# Patient Record
Sex: MALE | Race: WHITE | NOT HISPANIC OR LATINO | URBAN - METROPOLITAN AREA
[De-identification: names, ages, dates, MRNs, and addresses within clinical notes are randomized per-mention and may not be internally consistent; named-entity substitution may affect disease eponyms.]

---

## 2022-07-05 ENCOUNTER — EMERGENCY (EMERGENCY)
Facility: HOSPITAL | Age: 32
LOS: 0 days | Discharge: HOME | End: 2022-07-06
Attending: EMERGENCY MEDICINE
Payer: COMMERCIAL

## 2022-07-05 VITALS — WEIGHT: 250 LBS | HEIGHT: 69 IN
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
## 2022-07-05 DIAGNOSIS — M25.511 PAIN IN RIGHT SHOULDER: ICD-10-CM
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
## 2022-07-05 PROCEDURE — 99284 EMERGENCY DEPT VISIT MOD MDM: CPT

## 2022-07-05 PROCEDURE — 73030 X-RAY EXAM OF SHOULDER: CPT | Mod: RT

## 2022-07-05 PROCEDURE — 99283 EMERGENCY DEPT VISIT LOW MDM: CPT | Mod: 25

## 2022-07-05 PROCEDURE — 73030 X-RAY EXAM OF SHOULDER: CPT | Mod: 26,RT

## 2022-07-05 NOTE — ED ADULT TRIAGE NOTE - CHIEF COMPLAINT QUOTE
Pt presents to the ED c/o right shoulder pain. Pt states he was playing softball tonight when he slide head first and heard a pop. Pt unable to move extremity. No medication PTA.

## 2022-07-06 VITALS
OXYGEN SATURATION: 99 % | SYSTOLIC BLOOD PRESSURE: 116 MMHG | DIASTOLIC BLOOD PRESSURE: 80 MMHG | RESPIRATION RATE: 17 BRPM | TEMPERATURE: 98 F | HEART RATE: 97 BPM

## 2022-07-06 NOTE — ED PROVIDER NOTE - PATIENT PORTAL LINK FT
You can access the FollowMyHealth Patient Portal offered by [facility] by registering at the following website: [email or web address]. By joining [facility]’s FollowMyHealth portal, you will also be able to view your health information using other applications (apps) compatible with our system.

## 2022-07-06 NOTE — ED PROVIDER NOTE - NSFOLLOWUPINSTRUCTIONS_ED_ALL_ED_FT
please follow up with your orthopedist in 2-3 days.   use sling for comfort.    take motrin and tylenol for pain.   return to ED for any concerns

## 2022-07-07 NOTE — ED POST DISCHARGE NOTE - RESULT SUMMARY
Patient called with question about Xray, Unable to reach on return call, left message. - Laureano Page PA-C

## 2022-07-08 ENCOUNTER — APPOINTMENT (OUTPATIENT)
Dept: ORTHOPEDIC SURGERY | Facility: CLINIC | Age: 32
End: 2022-07-08

## 2022-07-08 VITALS — WEIGHT: 250 LBS | BODY MASS INDEX: 37.03 KG/M2 | HEIGHT: 69 IN

## 2022-07-08 DIAGNOSIS — M25.511 PAIN IN RIGHT SHOULDER: ICD-10-CM

## 2022-07-08 DIAGNOSIS — S43.004A UNSPECIFIED DISLOCATION OF RIGHT SHOULDER JOINT, INITIAL ENCOUNTER: ICD-10-CM

## 2022-07-08 PROBLEM — Z00.00 ENCOUNTER FOR PREVENTIVE HEALTH EXAMINATION: Status: ACTIVE | Noted: 2022-07-08

## 2022-07-08 PROBLEM — Z78.9 OTHER SPECIFIED HEALTH STATUS: Chronic | Status: ACTIVE | Noted: 2022-07-06

## 2022-07-08 PROCEDURE — 99213 OFFICE O/P EST LOW 20 MIN: CPT

## 2022-07-08 NOTE — HISTORY OF PRESENT ILLNESS
[4] : 4 [1] : 2 [Dull/Aching] : dull/aching [Localized] : localized [Tightness] : tightness [Sleep] : sleep [Rest] : rest [Exercising] : exercising [de-identified] : 7/8/22;  acute onset right shoulder pain.  shoulder popped out and back in. [] : no [FreeTextEntry1] : right shoulder [FreeTextEntry3] : 7/5/22 [FreeTextEntry5] : patient states he was playing softball and slid head first into a base and felt his shoulder dislocate. Went to [facility] ER and shoulder was reset. Given a sling. History of rotator cuff tear [FreeTextEntry6] : m [FreeTextEntry9] : minimal pain in sling [de-identified] : 7/5/22 [de-identified] : [facility] [de-identified] : xray

## 2022-07-08 NOTE — ASSESSMENT
[FreeTextEntry1] : acute onset right shoulder pain when shoulder popped out around 7/5/22.  concern for cuff / labral tear.

## 2022-07-08 NOTE — PHYSICAL EXAM
[Right] : right shoulder [3 ___] : forward flexion 3[unfilled]/5 [3___] : external rotation 3[unfilled]/5 [4___] : internal rotation 4[unfilled]/5 [] : motor and sensory intact distally [TWNoteComboBox7] : active forward flexion 40 degrees [TWNoteComboBox4] : passive forward flexion 90 degrees [TWNoteComboBox6] : internal rotation 30 degrees [de-identified] : external rotation 30 degrees

## 2022-07-08 NOTE — DATA REVIEWED
[Outside X-rays] : outside x-rays [Right] : of the right [I reviewed the films/CD and agree] : I reviewed the films/CD and agree

## 2022-07-10 ENCOUNTER — FORM ENCOUNTER (OUTPATIENT)
Age: 32
End: 2022-07-10

## 2022-07-11 ENCOUNTER — APPOINTMENT (OUTPATIENT)
Dept: MRI IMAGING | Facility: CLINIC | Age: 32
End: 2022-07-11

## 2022-07-11 PROCEDURE — 73221 MRI JOINT UPR EXTREM W/O DYE: CPT | Mod: RT

## 2022-07-14 ENCOUNTER — TRANSCRIPTION ENCOUNTER (OUTPATIENT)
Age: 32
End: 2022-07-14

## 2022-07-15 ENCOUNTER — APPOINTMENT (OUTPATIENT)
Dept: ORTHOPEDIC SURGERY | Facility: CLINIC | Age: 32
End: 2022-07-15

## 2022-07-15 VITALS — HEIGHT: 69 IN | WEIGHT: 280 LBS | BODY MASS INDEX: 41.47 KG/M2

## 2022-07-15 DIAGNOSIS — S43.431A SUPERIOR GLENOID LABRUM LESION OF RIGHT SHOULDER, INITIAL ENCOUNTER: ICD-10-CM

## 2022-07-15 PROCEDURE — 99214 OFFICE O/P EST MOD 30 MIN: CPT

## 2022-07-15 NOTE — PHYSICAL EXAM
[Right] : right shoulder [3 ___] : forward flexion 3[unfilled]/5 [3___] : external rotation 3[unfilled]/5 [4___] : internal rotation 4[unfilled]/5 [] : motor and sensory intact distally [TWNoteComboBox7] : active forward flexion 40 degrees [TWNoteComboBox4] : passive forward flexion 90 degrees [TWNoteComboBox6] : internal rotation 30 degrees [de-identified] : external rotation 30 degrees

## 2022-07-15 NOTE — HISTORY OF PRESENT ILLNESS
[Dull/Aching] : dull/aching [Localized] : localized [Tightness] : tightness [Sleep] : sleep [Rest] : rest [Exercising] : exercising [Full time] : Work status: full time [4] : 4 [1] : 2 [de-identified] : 7/8/22;  acute onset right shoulder pain.  shoulder popped out and back in. [] : no [FreeTextEntry1] : right shoulder [FreeTextEntry3] : 7/5/22 [FreeTextEntry5] : patient states he was playing softball and slid head first into a base and felt his shoulder dislocate. Went to [facility] ER and shoulder was reset. Given a sling. History of rotator cuff tear [FreeTextEntry9] : minimal pain in sling [de-identified] : 7/5/22 [de-identified] : [facility] [de-identified] : xray [de-identified] : MRI

## 2022-07-15 NOTE — ASSESSMENT
[FreeTextEntry1] : acute onset right shoulder pain when shoulder popped out around 7/5/22.  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

## 2022-08-08 ENCOUNTER — APPOINTMENT (OUTPATIENT)
Age: 32
End: 2022-08-08
Payer: COMMERCIAL

## 2022-08-08 PROCEDURE — 29826 SHO ARTHRS SRG DECOMPRESSION: CPT | Mod: RT

## 2022-08-08 PROCEDURE — 29821 SHO ARTHRS SRG COMPL SYNVCT: CPT | Mod: AS,59,RT

## 2022-08-08 PROCEDURE — 29821 SHO ARTHRS SRG COMPL SYNVCT: CPT | Mod: 59,RT

## 2022-08-08 PROCEDURE — 29827 SHO ARTHRS SRG RT8TR CUF RPR: CPT | Mod: RT

## 2022-08-08 PROCEDURE — 29827 SHO ARTHRS SRG RT8TR CUF RPR: CPT | Mod: AS,RT

## 2022-08-08 PROCEDURE — 29828 SHO ARTHRS SRG BICP TENODSIS: CPT | Mod: 59,RT

## 2022-08-08 PROCEDURE — 29826 SHO ARTHRS SRG DECOMPRESSION: CPT | Mod: AS,RT

## 2022-08-08 PROCEDURE — 29828 SHO ARTHRS SRG BICP TENODSIS: CPT | Mod: AS,59,RT

## 2022-08-08 PROCEDURE — 29823 SHO ARTHRS SRG XTNSV DBRDMT: CPT | Mod: AS,59,RT

## 2022-08-08 PROCEDURE — 29823 SHO ARTHRS SRG XTNSV DBRDMT: CPT | Mod: 59,RT

## 2022-08-08 RX ORDER — OXYCODONE AND ACETAMINOPHEN 5; 325 MG/1; MG/1
5-325 TABLET ORAL
Qty: 42 | Refills: 0 | Status: ACTIVE | COMMUNITY
Start: 2022-08-08 | End: 1900-01-01

## 2022-08-19 ENCOUNTER — APPOINTMENT (OUTPATIENT)
Dept: ORTHOPEDIC SURGERY | Facility: CLINIC | Age: 32
End: 2022-08-19

## 2022-08-19 VITALS — BODY MASS INDEX: 41.47 KG/M2 | HEIGHT: 69 IN | WEIGHT: 280 LBS

## 2022-08-19 PROCEDURE — 99024 POSTOP FOLLOW-UP VISIT: CPT

## 2022-08-19 NOTE — HISTORY OF PRESENT ILLNESS
[3] : 3 [2] : 2 [Dull/Aching] : dull/aching [de-identified] : 7/8/22;  acute onset right shoulder pain.  shoulder popped out and back in.\par 8/19/22: s/p arthroscopy right shoulder, no f/c/s. Pain is manageable.  [FreeTextEntry1] : right shoulder [de-identified] : physical therapy

## 2022-08-19 NOTE — ASSESSMENT
[FreeTextEntry1] : s/p shoulder arthroscopy cuff repair, biceps tenodesis, labral debridement, decompression, acromioplasty, 8/8/22.\par \par surgical images reviewed.\par Sling use discussed.\par questions addressed.

## 2022-08-19 NOTE — PHYSICAL EXAM
[Right] : right shoulder [] : motor and sensory intact distally [FreeTextEntry3] : anteriorly [TWNoteComboBox7] : False [TWNoteComboBox4] : False [TWNoteComboBox6] : False [de-identified] : False

## 2022-09-09 ENCOUNTER — APPOINTMENT (OUTPATIENT)
Dept: ORTHOPEDIC SURGERY | Facility: CLINIC | Age: 32
End: 2022-09-09

## 2022-09-09 VITALS — WEIGHT: 250 LBS | BODY MASS INDEX: 37.03 KG/M2 | HEIGHT: 69 IN

## 2022-09-09 PROCEDURE — 99024 POSTOP FOLLOW-UP VISIT: CPT

## 2022-09-09 NOTE — HISTORY OF PRESENT ILLNESS
[1] : 2 [Dull/Aching] : dull/aching [de-identified] : 8/19/22:  s/p arthroscopy right shoulder, no f/c/s. Pain is manageable. \par 9/9/22:  follow up right shoulder.  improving with pt but still pain.   [] : no [FreeTextEntry1] : right shoulder [de-identified] : physical therapy 2x a week

## 2022-09-09 NOTE — DISCUSSION/SUMMARY
[de-identified] : Progress note completed by Anitha Lezama PA-C\par *Dr. Ryan - The [facility] assigned on this date saw this patient independently.  I have reviewed the note and agree with the treatment provided.

## 2022-09-09 NOTE — ASSESSMENT
[FreeTextEntry1] : s/p shoulder arthroscopy cuff repair, biceps tenodesis, labral debridement, decompression, acromioplasty, 8/8/22.  improving with pt but still tight and weak. \par \par

## 2022-09-09 NOTE — PHYSICAL EXAM
[Right] : right shoulder [] : no ecchymosis [FreeTextEntry3] : well healed surgical scar.  [TWNoteComboBox4] : passive forward flexion 100 degrees [TWNoteComboBox9] : passive abduction 90 degrees [TWNoteComboBox6] : internal rotation L5 [de-identified] : external rotation 10 degrees

## 2022-10-21 ENCOUNTER — APPOINTMENT (OUTPATIENT)
Dept: ORTHOPEDIC SURGERY | Facility: CLINIC | Age: 32
End: 2022-10-21

## 2022-10-21 ENCOUNTER — NON-APPOINTMENT (OUTPATIENT)
Age: 32
End: 2022-10-21

## 2022-10-21 VITALS — HEIGHT: 69 IN | BODY MASS INDEX: 37.03 KG/M2 | WEIGHT: 250 LBS

## 2022-10-21 DIAGNOSIS — M75.101 UNSPECIFIED ROTATOR CUFF TEAR OR RUPTURE OF RIGHT SHOULDER, NOT SPECIFIED AS TRAUMATIC: ICD-10-CM

## 2022-10-21 PROCEDURE — 99024 POSTOP FOLLOW-UP VISIT: CPT

## 2022-10-21 NOTE — DISCUSSION/SUMMARY
[de-identified] : Instructions:  Progress Note completed by Anitha Lezama PA-C\par * Dr. Ryan -- The documentation recorded accurately reflects the decisions made by me during this visit.

## 2022-10-21 NOTE — ASSESSMENT
[FreeTextEntry1] : s/p shoulder arthroscopy cuff repair, biceps tenodesis, labral debridement, decompression, acromioplasty, 8/8/22.   improving with pt.  still some tightness and weakness.   \par \par

## 2022-10-21 NOTE — HISTORY OF PRESENT ILLNESS
[1] : 2 [Dull/Aching] : dull/aching [de-identified] : 8/19/22:  s/p arthroscopy right shoulder, no f/c/s. Pain is manageable. \par 9/9/22:  follow up right shoulder.  improving with pt but still pain.  \par 10/21/22: follow up right shoulder.  improved.  still some tightness but overall better with pt.  [] : no [FreeTextEntry1] : right shoulder [FreeTextEntry6] : sore  [de-identified] : physical therapy 2x a week

## 2022-10-21 NOTE — PHYSICAL EXAM
[Right] : right shoulder [4 ___] : forward flexion 4[unfilled]/5 [4___] : internal rotation 4[unfilled]/5 [] : fatigue with strength testing [FreeTextEntry3] : well healed surgical scars. [TWNoteComboBox7] : active forward flexion 165 degrees [TWNoteComboBox4] : passive forward flexion 100 degrees [de-identified] : active abduction 130 degrees [TWNoteComboBox9] : passive abduction 90 degrees [TWNoteComboBox6] : internal rotation L5 [de-identified] : external rotation 45 degrees

## 2022-12-02 ENCOUNTER — APPOINTMENT (OUTPATIENT)
Dept: ORTHOPEDIC SURGERY | Facility: CLINIC | Age: 32
End: 2022-12-02